# Patient Record
Sex: FEMALE
[De-identification: names, ages, dates, MRNs, and addresses within clinical notes are randomized per-mention and may not be internally consistent; named-entity substitution may affect disease eponyms.]

---

## 2018-07-14 ENCOUNTER — HOSPITAL ENCOUNTER (EMERGENCY)
Dept: HOSPITAL 31 - C.ER | Age: 48
LOS: 1 days | Discharge: HOME | End: 2018-07-15
Payer: COMMERCIAL

## 2018-07-14 DIAGNOSIS — R07.9: Primary | ICD-10-CM

## 2018-07-14 LAB
APTT BLD: 30 SECONDS (ref 21–34)
BASOPHILS # BLD AUTO: 0.1 K/UL (ref 0–0.2)
BASOPHILS NFR BLD: 0.6 % (ref 0–2)
EOSINOPHIL # BLD AUTO: 0.2 K/UL (ref 0–0.7)
EOSINOPHIL NFR BLD: 1.8 % (ref 0–4)
ERYTHROCYTE [DISTWIDTH] IN BLOOD BY AUTOMATED COUNT: 16.7 % (ref 11.5–14.5)
HGB BLD-MCNC: 10.7 G/DL (ref 11–16)
INR PPP: 1
LYMPHOCYTES # BLD AUTO: 4 K/UL (ref 1–4.3)
LYMPHOCYTES NFR BLD AUTO: 44.2 % (ref 20–40)
MCH RBC QN AUTO: 23.4 PG (ref 27–31)
MCHC RBC AUTO-ENTMCNC: 32.8 G/DL (ref 33–37)
MCV RBC AUTO: 71.4 FL (ref 81–99)
MONOCYTES # BLD: 0.6 K/UL (ref 0–0.8)
MONOCYTES NFR BLD: 6.9 % (ref 0–10)
NEUTROPHILS # BLD: 4.2 K/UL (ref 1.8–7)
NEUTROPHILS NFR BLD AUTO: 46.5 % (ref 50–75)
NRBC BLD AUTO-RTO: 0.1 % (ref 0–2)
PLATELET # BLD: 278 K/UL (ref 130–400)
PMV BLD AUTO: 8.3 FL (ref 7.2–11.7)
PROTHROMBIN TIME: 11.1 SECONDS (ref 9.7–12.2)
RBC # BLD AUTO: 4.56 MIL/UL (ref 3.8–5.2)
WBC # BLD AUTO: 8.9 K/UL (ref 4.8–10.8)

## 2018-07-14 NOTE — C.PDOC
History Of Present Illness





49 yo female, no priox hx, presetns with left sided cp. pt states pain started 

9pm. pt cannot characterize her pain. pt travels into left arm. no fevers, no 

cough, no sob. no n/v/d, took asa at home. pain is now nearly resolved. 


Time Seen by Provider: 07/14/18 23:11


Chief Complaint (Nursing): Chest Pain





Past Medical History


Reviewed: Historical Data, Nursing Documentation, Vital Signs


Vital Signs: 


 Last Vital Signs











Temp  98.3 F   07/15/18 04:36


 


Pulse  67   07/15/18 04:36


 


Resp  16   07/15/18 04:36


 


BP  94/62 L  07/15/18 04:36


 


Pulse Ox  100   07/15/18 04:36











Surgical History: Cholecystectomy (2012)


Family History: States: Unknown Family Hx





- Social History


Hx Alcohol Use: No


Hx Substance Use: No





- Immunization History


Hx Tetanus Toxoid Vaccination: No


Hx Influenza Vaccination: No


Hx Pneumococcal Vaccination: No





Review Of Systems


Cardiovascular: Positive for: Chest Pain





Physical Exam





- Physical Exam


Appears: Well, No Acute Distress


Skin: Normal Color, Warm, Dry


Eye(s): bilateral: Normal Inspection, PERRL, EOMI


Nose: Normal


Throat: Normal


Neck: Normal


Cardiovascular: Rhythm Regular


Respiratory: Normal Breath Sounds


Gastrointestinal/Abdominal: Normal Exam, Soft, No Tenderness, No Guarding, No 

Rebound


Back: Normal Inspection


Extremity: Normal ROM


Neurological/Psych: Oriented x3





ED Course And Treatment





- Laboratory Results


Result Diagrams: 


 07/14/18 23:41





 07/14/18 23:41


O2 Sat by Pulse Oximetry: 99





Medical Decision Making


Medical Decision Making: 





cp ro acs - ekg nsr 71 no st t wave changes. nomral intervals asa pta. 





Pain now has resolved





200: pt sleepign in nad. pendig 2nd trop





400 2nd trop neg. pain resolved. atypical pain, low heart score no sob symptoms 

acs pe less likely. pt asking for dc. 





Disposition





- Disposition


Referrals: 


Galen Cassidy MD [Staff Provider] - 


Disposition: HOME/ ROUTINE


Disposition Time: 04:00


Condition: STABLE


Additional Instructions: 


please follow up with your doctor and specialist. return to er with worsening 

symptoms or concerns. 


Instructions:  Chest Pain


Forms:  [facility] (English)





- Clinical Impression


Clinical Impression: 


 Chest pain

## 2018-07-15 VITALS
HEART RATE: 67 BPM | SYSTOLIC BLOOD PRESSURE: 94 MMHG | RESPIRATION RATE: 16 BRPM | TEMPERATURE: 98.3 F | DIASTOLIC BLOOD PRESSURE: 62 MMHG

## 2018-07-15 VITALS — OXYGEN SATURATION: 99 %

## 2018-07-15 LAB
ALBUMIN SERPL-MCNC: 4.1 G/DL (ref 3.5–5)
ALBUMIN/GLOB SERPL: 1.2 {RATIO} (ref 1–2.1)
ALT SERPL-CCNC: 18 U/L (ref 9–52)
AST SERPL-CCNC: 16 U/L (ref 14–36)
BILIRUB UR-MCNC: NEGATIVE MG/DL
BUN SERPL-MCNC: 10 MG/DL (ref 7–17)
CALCIUM SERPL-MCNC: 8.6 MG/DL (ref 8.6–10.4)
GFR NON-AFRICAN AMERICAN: > 60
GLUCOSE UR STRIP-MCNC: NORMAL MG/DL
HCG,QUALITATIVE URINE: NEGATIVE
LEUKOCYTE ESTERASE UR-ACNC: (no result) LEU/UL
PH UR STRIP: 7 [PH] (ref 5–8)
PROT UR STRIP-MCNC: NEGATIVE MG/DL
RBC # UR STRIP: (no result) /UL
SP GR UR STRIP: 1.01 (ref 1–1.03)
SQUAMOUS EPITHIAL: 1 /HPF (ref 0–5)
UROBILINOGEN UR-MCNC: NORMAL MG/DL (ref 0.2–1)

## 2018-07-15 NOTE — RAD
Chest x-ray two views 



History: Chest pain. 



Comparison: None available. 



Findings: 



Diffuse increased interstitial lung markings. 



Biapical pleural thickening with upper lobe granulomatous changes. 



Mild venous congestion. 



Heart size within normal limits. 



Degenerative changes in the spine. 



Impression: 



Diffuse increased interstitial lung markings. 



Biapical pleural thickening with upper lobe granulomatous changes. 



Mild venous congestion. 



Heart size within normal limits.

## 2018-07-17 NOTE — CARD
--------------- APPROVED REPORT --------------





Date of service: 07/14/2018



EKG Measurement

Heart [id number]

ME [id number]

[id number]

[id number]

[id number]



<Conclusion>

Normal sinus rhythm

Normal ECG